# Patient Record
Sex: MALE | Race: BLACK OR AFRICAN AMERICAN | NOT HISPANIC OR LATINO | URBAN - METROPOLITAN AREA
[De-identification: names, ages, dates, MRNs, and addresses within clinical notes are randomized per-mention and may not be internally consistent; named-entity substitution may affect disease eponyms.]

---

## 2017-08-02 ENCOUNTER — IMPORTED ENCOUNTER (OUTPATIENT)
Dept: URBAN - METROPOLITAN AREA CLINIC 31 | Facility: CLINIC | Age: 27
End: 2017-08-02

## 2017-08-02 PROBLEM — H18.623: Noted: 2017-08-02

## 2017-08-02 PROCEDURE — 92025 CPTRIZED CORNEAL TOPOGRAPHY: CPT

## 2017-08-02 PROCEDURE — 99243 OFF/OP CNSLTJ NEW/EST LOW 30: CPT

## 2017-08-02 NOTE — PATIENT DISCUSSION
Keratoconus can be caused by eye rubbing or can be inherited. Explained importance of no eye rubbing to prevent progression. Condition is worsening Treatment options discussed include glasses CL collagen cross linking. Because of definitive progression would recommend collagen cross linking. Risks and benefits of surgery discussed including infection and corneal haze. Schedule collagen cross linking OS if desired t/c OD in future. Return for an appointment for Minor Procedure. with Dr. Barbar Castleman.

## 2018-09-05 ENCOUNTER — IMPORTED ENCOUNTER (OUTPATIENT)
Dept: URBAN - METROPOLITAN AREA CLINIC 31 | Facility: CLINIC | Age: 28
End: 2018-09-05

## 2018-09-05 PROBLEM — H18.623: Noted: 2018-09-05

## 2018-09-05 PROBLEM — H10.403: Noted: 2018-09-05

## 2018-09-05 PROCEDURE — 92025 CPTRIZED CORNEAL TOPOGRAPHY: CPT

## 2018-09-05 PROCEDURE — 99213 OFFICE O/P EST LOW 20 MIN: CPT

## 2018-09-05 NOTE — PATIENT DISCUSSION
1. Keratoconus can be caused by eye rubbing or can be inherited. Explained importance of no eye rubbing to prevent progression. Condition is worsening. PT is rubbing the eyes from itching add topical antihistamine qd. Treatment options discussed include glasses contact lens  and collagen cross linking. Risks and benefits of procedure reviewed including infection and haze. Schedule CCX OS t/c OD when OS stable2. Allergic Conjunctivitis OU -- The condition was  discussed with the patient. Avoidance of allergens and cool compresses were recommended.  topical antihistamine prn

## 2018-09-05 NOTE — PATIENT DISCUSSION
Allergic Conjunctivitis OU -- The condition was  discussed with the patient. Avoidance of allergens and cool compresses were recommended.  topical antihistamine prn

## 2018-10-03 ENCOUNTER — IMPORTED ENCOUNTER (OUTPATIENT)
Dept: URBAN - METROPOLITAN AREA CLINIC 31 | Facility: CLINIC | Age: 28
End: 2018-10-03

## 2018-10-03 PROBLEM — H18.622: Noted: 2018-10-03

## 2018-10-03 PROCEDURE — 92071 CONTACT LENS FITTING FOR TX: CPT

## 2018-10-03 NOTE — PATIENT DISCUSSION
1.  Collagen cross linking procedure performed without difficulty. BCL placed. Postop drops and instruction sheet reviewed with patient. Call with any problems. Postop visit in 4-7 days2. BCL placed on affected eye. Good fit. 3. Return for an appointment for post op exam. with Dr. Sera Paiz.

## 2018-10-04 ENCOUNTER — IMPORTED ENCOUNTER (OUTPATIENT)
Dept: URBAN - METROPOLITAN AREA CLINIC 31 | Facility: CLINIC | Age: 28
End: 2018-10-04

## 2018-10-04 PROCEDURE — 92071 CONTACT LENS FITTING FOR TX: CPT

## 2018-10-04 PROCEDURE — 99024 POSTOP FOLLOW-UP VISIT: CPT

## 2018-10-09 ENCOUNTER — IMPORTED ENCOUNTER (OUTPATIENT)
Dept: URBAN - METROPOLITAN AREA CLINIC 31 | Facility: CLINIC | Age: 28
End: 2018-10-09

## 2018-10-09 PROBLEM — Z98.89: Noted: 2018-10-09

## 2018-10-09 PROCEDURE — 99024 POSTOP FOLLOW-UP VISIT: CPT

## 2018-10-09 NOTE — PATIENT DISCUSSION
Post Operative: Doing well po drops as instructed tears prn. Call with any problems. Moxifloxacin for 2 days then DC DC prolensa Durezol start taper as per instructions. Tears 3-4x/dReturn for an appointment in 3 weeks for post op exam. with Dr. Norma Vergara.

## 2018-10-23 ENCOUNTER — IMPORTED ENCOUNTER (OUTPATIENT)
Dept: URBAN - METROPOLITAN AREA CLINIC 31 | Facility: CLINIC | Age: 28
End: 2018-10-23

## 2018-10-23 PROBLEM — Z98.89: Noted: 2018-10-23

## 2018-10-23 PROCEDURE — 99024 POSTOP FOLLOW-UP VISIT: CPT

## 2018-10-23 NOTE — PATIENT DISCUSSION
Post Operative: Doing well po drops as instructed tears prn. Call with any problems. Durezol 2x/d for 1 week and stop then switch to Lotemax 2x/d for 1 month then 1x/d for 1 monthReturn for an appointment in 1 month for post op refraction. Topography. with Dr. Anamika Zamarripa.

## 2018-11-27 ENCOUNTER — IMPORTED ENCOUNTER (OUTPATIENT)
Dept: URBAN - METROPOLITAN AREA CLINIC 31 | Facility: CLINIC | Age: 28
End: 2018-11-27

## 2018-11-27 PROBLEM — Z98.89: Noted: 2018-11-27

## 2018-11-27 PROCEDURE — 99024 POSTOP FOLLOW-UP VISIT: CPT

## 2018-11-27 NOTE — PATIENT DISCUSSION
Post Operative: Doing well po drops as instructed lotemax qd for a month then dc tears prn. Call with any problems. Return for an appointment in 1 month for post op refraction. Topography. with Dr. Brooks Hassan.

## 2019-01-02 ENCOUNTER — IMPORTED ENCOUNTER (OUTPATIENT)
Dept: URBAN - METROPOLITAN AREA CLINIC 31 | Facility: CLINIC | Age: 29
End: 2019-01-02

## 2019-01-02 PROBLEM — Z98.89: Noted: 2019-01-02

## 2019-01-02 PROCEDURE — 99024 POSTOP FOLLOW-UP VISIT: CPT

## 2019-01-02 PROCEDURE — 92025 CPTRIZED CORNEAL TOPOGRAPHY: CPT

## 2019-01-02 NOTE — PATIENT DISCUSSION
Post Operative: Doing well po drops as instructed tears prn. Call with any problems. Lotemax qd for a month then dc. Return for an appointment in 6 weeks for office call. MRx and Topography. with Dr. Unique Hill.

## 2019-02-12 ENCOUNTER — IMPORTED ENCOUNTER (OUTPATIENT)
Dept: URBAN - METROPOLITAN AREA CLINIC 31 | Facility: CLINIC | Age: 29
End: 2019-02-12

## 2019-02-12 PROBLEM — H18.602: Noted: 2019-02-12

## 2019-02-12 PROCEDURE — 99213 OFFICE O/P EST LOW 20 MIN: CPT

## 2019-02-12 PROCEDURE — 92025 CPTRIZED CORNEAL TOPOGRAPHY: CPT

## 2019-02-12 NOTE — PATIENT DISCUSSION
Keratoconus OS -  can be caused by eye rubbing or can be inherited. Explained importance of no eye rubbing to prevent progression. s/p CCX improvement in BSCV change lens osReturn for an appointment in 2 months for office call. with Dr. Rancho Young.

## 2019-04-12 ENCOUNTER — IMPORTED ENCOUNTER (OUTPATIENT)
Dept: URBAN - METROPOLITAN AREA CLINIC 31 | Facility: CLINIC | Age: 29
End: 2019-04-12

## 2019-04-12 PROBLEM — H18.602: Noted: 2019-04-12

## 2019-04-12 PROCEDURE — 99213 OFFICE O/P EST LOW 20 MIN: CPT

## 2019-04-12 NOTE — PATIENT DISCUSSION
Keratocnus OD_ Can proceed with CXL in 1 month when patient ready. Keratoconus OS -  can be caused by eye rubbing or can be inherited. Explained importance of no eye rubbing to prevent progression. s/p CCX improvement in BSCV change lens os checked rx again today. Fill todays rx. Return for an appointment in 2 months for office call. with Dr. Flavio Frankel.

## 2019-07-24 ENCOUNTER — IMPORTED ENCOUNTER (OUTPATIENT)
Dept: URBAN - METROPOLITAN AREA CLINIC 31 | Facility: CLINIC | Age: 29
End: 2019-07-24

## 2019-07-24 PROBLEM — H18.602: Noted: 2019-07-24

## 2019-07-24 PROCEDURE — 99213 OFFICE O/P EST LOW 20 MIN: CPT

## 2019-07-24 NOTE — PATIENT DISCUSSION
Keratocnus OD_ Can proceed with CXL  patient ready. Keratoconus OS -  can be caused by eye rubbing or can be inherited. Explained importance of no eye rubbing to prevent progression. s/p CCX improvement in BSCV glasses ok for now t/c change in future t/c CL fit if wants to see Carolyn for an appointment in 2 months for office call. with Dr. Onel Arteaga.

## 2019-10-21 ENCOUNTER — IMPORTED ENCOUNTER (OUTPATIENT)
Dept: URBAN - METROPOLITAN AREA CLINIC 31 | Facility: CLINIC | Age: 29
End: 2019-10-21

## 2019-10-21 PROBLEM — H18.603: Noted: 2019-10-21

## 2019-10-21 PROCEDURE — 92025 CPTRIZED CORNEAL TOPOGRAPHY: CPT

## 2019-10-21 PROCEDURE — 99213 OFFICE O/P EST LOW 20 MIN: CPT

## 2019-10-21 NOTE — PATIENT DISCUSSION
Keratoconus OU - can be caused by eye rubbing or can be inherited. Explained importance of no eye rubbing to prevent progression. s/p CCx OS stable romina and Ivis Alvarado for an appointment in 4 months for office call. MRx and Topography. with Dr. Meg Baird.

## 2020-02-14 ENCOUNTER — IMPORTED ENCOUNTER (OUTPATIENT)
Dept: URBAN - METROPOLITAN AREA CLINIC 31 | Facility: CLINIC | Age: 30
End: 2020-02-14

## 2020-02-14 PROBLEM — H18.603: Noted: 2020-02-14

## 2020-02-14 PROCEDURE — 99213 OFFICE O/P EST LOW 20 MIN: CPT

## 2020-02-14 PROCEDURE — 92025 CPTRIZED CORNEAL TOPOGRAPHY: CPT

## 2020-02-14 NOTE — PATIENT DISCUSSION
Keratoconus OU - can be caused by eye rubbing or can be inherited. Explained importance of no eye rubbing to prevent progression. s/p CCx OS improved romina todayOD stable no progressionReturn for an appointment in 6 months for office call. MRx and Topography. with Dr. Ainta Bermudez.

## 2020-09-02 ENCOUNTER — IMPORTED ENCOUNTER (OUTPATIENT)
Dept: URBAN - METROPOLITAN AREA CLINIC 31 | Facility: CLINIC | Age: 30
End: 2020-09-02

## 2020-09-02 PROBLEM — H18.603: Noted: 2020-09-02

## 2020-09-02 PROCEDURE — 99213 OFFICE O/P EST LOW 20 MIN: CPT

## 2020-09-02 PROCEDURE — 92025 CPTRIZED CORNEAL TOPOGRAPHY: CPT

## 2020-09-02 NOTE — PATIENT DISCUSSION
Keratoconus OU - s/p CXL OS stable romina refraction ouReturn for an appointment in 6 months for office call. MRx and Topography. with Dr. Anita Bermudez.

## 2021-03-02 ENCOUNTER — IMPORTED ENCOUNTER (OUTPATIENT)
Dept: URBAN - METROPOLITAN AREA CLINIC 31 | Facility: CLINIC | Age: 31
End: 2021-03-02

## 2021-03-02 PROBLEM — H18.603: Noted: 2021-03-02

## 2021-03-02 PROCEDURE — 92025 CPTRIZED CORNEAL TOPOGRAPHY: CPT

## 2021-03-02 PROCEDURE — 99213 OFFICE O/P EST LOW 20 MIN: CPT

## 2021-03-02 PROCEDURE — 92015 DETERMINE REFRACTIVE STATE: CPT

## 2021-03-02 NOTE — PATIENT DISCUSSION
Keratoconus OU - can be caused by eye rubbing or can be inherited. Explained importance of no eye rubbing to prevent progression. s/p CCLx OS  OD stable without treatmentReturn for an appointment in 6 months for office call. MRx and Topography. with Dr. Brigette Hardin.

## 2021-03-19 ENCOUNTER — IMPORTED ENCOUNTER (OUTPATIENT)
Dept: URBAN - METROPOLITAN AREA CLINIC 31 | Facility: CLINIC | Age: 31
End: 2021-03-19

## 2021-08-31 ENCOUNTER — IMPORTED ENCOUNTER (OUTPATIENT)
Dept: URBAN - METROPOLITAN AREA CLINIC 31 | Facility: CLINIC | Age: 31
End: 2021-08-31

## 2021-08-31 PROBLEM — H18.603: Noted: 2021-08-31

## 2021-08-31 PROCEDURE — 99213 OFFICE O/P EST LOW 20 MIN: CPT

## 2021-08-31 PROCEDURE — 92025 CPTRIZED CORNEAL TOPOGRAPHY: CPT

## 2021-08-31 NOTE — PATIENT DISCUSSION
Keratoconus OU - can be caused by eye rubbing or can be inherited. Explained importance of no eye rubbing to prevent progression. s/p CCLx OS  OD stable without treatmentMay need weakening of glasses over timeReturn for an appointment in 6 months for office call. MRx and Topography. with Dr. Jerry Dawson.

## 2022-03-07 ENCOUNTER — IMPORTED ENCOUNTER (OUTPATIENT)
Dept: URBAN - METROPOLITAN AREA CLINIC 31 | Facility: CLINIC | Age: 32
End: 2022-03-07

## 2022-03-07 PROBLEM — H18.603: Noted: 2022-03-07

## 2022-03-07 PROCEDURE — 99213 OFFICE O/P EST LOW 20 MIN: CPT

## 2022-03-07 PROCEDURE — 92025 CPTRIZED CORNEAL TOPOGRAPHY: CPT

## 2022-03-07 NOTE — PATIENT DISCUSSION
Keratoconus OU - can be caused by eye rubbing or can be inherited. Explained importance of no eye rubbing to prevent progression. s/p CCLx OS  OD stable without treatmentCurrent Rx okReturn for an appointment in 12 months for comprehensive exam. Topography. with Dr. Yuriy Montalvo.

## 2022-04-02 ASSESSMENT — VISUAL ACUITY
OD_PH: SC 20/30
OD_SC: 20/40
OS_SC: 20/100
OS_PH: CC 20/40
OD_CC: 20/30-2
OD_CC: 20/40+2
OS_PH: SC 20/40 -2
OS_CC: 20/400
OD_PH: SC 20/25 +1
OS_SC: 20/40
OS_PH: CC 20/30
OS_PH: SC 20/60
OS_SC: 20/200
OD_SC: 20/40+2
OS_SC: 20/30+2
OS_PH: SC 20/50
OS_PH: CC 20/40
OD_SC: 20/30
OS_PH: CC 20/40 -2
OS_SC: 20/80
OD_SC: 20/25
OS_PH: CC 20/40
OS_CC: 20/400
OS_SC: 20/100
OS_CC: 20/200
OS_CC: 20/200
OS_SC: 20/200
OD_SC: 20/30
OD_SC: 20/40
OS_SC: 20/40
OD_SC: 20/30-2
OS_CC: 20/400
OD_SC: 20/25
OS_PH: SC 20/30 -2
OD_SC: 20/30+2
OS_SC: 20/80
OD_CC: 20/40
OS_SC: 20/80-1
OD_CC: 20/25+2
OS_SC: 20/60+2
OD_SC: 20/30-2
OS_SC: 20/40
OD_CC: 20/40-1
OS_CC: 20/200
OD_SC: 20/25
OS_PH: SC 20/40
OD_SC: 20/30-2
OS_PH: SC 20/50 +1
OS_PH: SC 20/80 +1
OD_PH: CC 20/25
OS_CC: 20/200
OD_SC: 20/25
OD_CC: 20/40
OD_PH: 20/30 -1
OS_CC: 20/200
OS_SC: 20/200

## 2022-04-02 ASSESSMENT — TONOMETRY
OD_IOP_MMHG: 18
OS_IOP_MMHG: 18